# Patient Record
(demographics unavailable — no encounter records)

---

## 2025-04-01 NOTE — HISTORY OF PRESENT ILLNESS
[de-identified] : 04/01/2025 :GEMMA WEI , a 60 year old male, presents today for right wrist pain x1mo. no trauma. tried acupuncture, recently pain was so bad he couldn't sleep- got csi and ibuprofen 800mg, helped for only 2d, tried to pay golf and it got worse LHD, .  h/o DM

## 2025-04-01 NOTE — ASSESSMENT
[FreeTextEntry1] : The patient was advised of the diagnosis. The natural history of the pathology was explained in full to the patient in layman's terms. All questions were answered. The risks and benefits of surgical and non-surgical treatment alternatives were explained in full to the patient.  primary is likely tendinitis causing carpal tunnel syndrome- uncertain diagnosis for the causer of the tendinitis- new diagnosis uncertain prognosis  The risks, benefits and contents of the injection have been discussed.  Risks include but are not limited to allergic reaction, flare reaction, permanent white skin discoloration at the injection site and infection. The patient understands the risks and agrees to having the injection. We also discussed that about 22% of patients have relief at a year but the rest have recurrence if the symptoms. However, if the injection is successful it is a positive predictor of benefit of surgery. Success of the injection to relieve symptoms is also a great diagnostic test and obviates the need for EMG testing. The patient verbalized understanding. All questions have been answered. A sterile prep of the right volar wrist was performed and the carpal tunnel was entered and injected with 1 cc of Lidocaine and 6mg of celestone. The patient tolerated the procedure well without complication. The patient was instructed to ice the area of the injection.   I explained that steroids quickly control the illness by slowing the immune response. Since they slow the immune response generally and not in a specific way only against the disease, the body may have a more difficult time fighting infection and thus patients taking steroids are more susceptible to infections.   CSI #1 performed to right carpal tunnel.   Discussed hyperglycemia after injection and tx options.  The patient will monitor and report to PCP.   Discussed possibility of MRI if no improvement.  RTO 4 weeks

## 2025-04-01 NOTE — IMAGING
[de-identified] : Right wrist right hand no swelling or deformity no thenar atrophy pain with finger flexion -tips to dpc 2cm, pain with wrist extension and with supination decreased sensation to the median nerve intact ulnar and radial sensation ain/pin/ulnar motor intact +tinels, phalens and durkans, negative spurling sign palpable pulses with CR<2s full motion to the elbow, shoulder   Right wrist x-rays 3 views taken today in office demonstrate no acute changes